# Patient Record
Sex: FEMALE | Race: WHITE
[De-identification: names, ages, dates, MRNs, and addresses within clinical notes are randomized per-mention and may not be internally consistent; named-entity substitution may affect disease eponyms.]

---

## 2017-10-16 ENCOUNTER — HOSPITAL ENCOUNTER (OUTPATIENT)
Dept: HOSPITAL 46 - OPS | Age: 72
Discharge: HOME | End: 2017-10-16
Attending: SURGERY
Payer: MEDICARE

## 2017-10-16 VITALS — BODY MASS INDEX: 39.32 KG/M2 | WEIGHT: 236 LBS | HEIGHT: 65 IN

## 2017-10-16 DIAGNOSIS — E11.9: ICD-10-CM

## 2017-10-16 DIAGNOSIS — D12.3: Primary | ICD-10-CM

## 2017-10-16 DIAGNOSIS — Z98.51: ICD-10-CM

## 2017-10-16 DIAGNOSIS — E03.9: ICD-10-CM

## 2017-10-16 DIAGNOSIS — I10: ICD-10-CM

## 2017-10-16 DIAGNOSIS — Z98.890: ICD-10-CM

## 2017-10-16 DIAGNOSIS — Z88.8: ICD-10-CM

## 2017-10-16 DIAGNOSIS — Z88.5: ICD-10-CM

## 2017-10-16 DIAGNOSIS — K62.1: ICD-10-CM

## 2017-10-16 DIAGNOSIS — Z90.49: ICD-10-CM

## 2017-10-16 DIAGNOSIS — K21.9: ICD-10-CM

## 2017-10-16 DIAGNOSIS — E66.9: ICD-10-CM

## 2017-10-16 DIAGNOSIS — Z88.1: ICD-10-CM

## 2017-10-16 DIAGNOSIS — G47.30: ICD-10-CM

## 2017-10-16 PROCEDURE — 0DBL8ZX EXCISION OF TRANSVERSE COLON, VIA NATURAL OR ARTIFICIAL OPENING ENDOSCOPIC, DIAGNOSTIC: ICD-10-PCS | Performed by: SURGERY

## 2017-10-16 PROCEDURE — 0DBP8ZX EXCISION OF RECTUM, VIA NATURAL OR ARTIFICIAL OPENING ENDOSCOPIC, DIAGNOSTIC: ICD-10-PCS | Performed by: SURGERY

## 2017-11-03 NOTE — OR
[facility]
                                    [address], [place]  [address]
_________________________________________________________________________________________
                                                                 Signed   
 
 
DATE OF PROCEDURE:  10/16/17
 
PREOPERATIVE DIAGNOSIS:  Episodic diarrhea.
 
POSTOPERATIVE DIAGNOSIS
Small polyp of splenic flexure (excised), no evidence of obvious colitis.
 
PROCEDURE
Total colonoscopy to cecum with biopsy of rectum and excision of polyp at splenic
flexure.
 
ANESTHESIA
Intravenous sedation with Propofol infusion, Zane De La Rosa CRNA.
 
INDICATION
This markedly debilitated 72-year-old obese white woman is a patient Dr. Peres and
known to me from the past. She underwent cholecystectomy in 1984. She has had
colonoscopy and 1 997, 2008, and 2013. She has medical problems of diabetes,
hypothyroidism, hypertension, and "irregular heartbeat" as well as asthma and sleep
apnea, in addition to her obesity. She has complained of diarrhea most commonly in the
evening if she eats late.  She has had no blood per rectum. She has had a polyp on
colonoscopy in the past. She is admitted to undergo colonoscopy to better characterize
the source of her diarrhea and understand the risks of bleeding, infection, and
perforation.
 
FINDINGS
The prep wa s good. Complete colonoscopy was undertaken to the cecum. Propofol
infusional anesthesia was used based on her elevated ASA level.
 
There was a very small polyp noted at splenic flexure, which was excised with cold
morcellation technique. Biopsy of the rectum was undertaken, the mucosa did not look
excessively abnormal.
 
DESCRIPTION OF PROCEDURE
The patient brought to the endoscopy suite and placed in lateral decubitus position.
Given intravenous sedation to the point of slurred speech and nystagmus with Propofol 
infusional technique with full cardiopulmonary monitoring by the anesthetist. A digital
rectal examination was normal.
 
An Olympus video colonoscope was passed in the rectum and manipulated throughout the
colon. Ultimately intubating the cecum, the  ileocecal valve and appendiceal orifice
were normal. Scope was withdrawn from that point and irrigation undertaken as necessary.
 
    Electronically Signed By: JAOSN GARCIAS MD  11/03/17 0748
_________________________________________________________________________________________
PATIENT NAME:     MATY CHAPPELL                        
MEDICAL RECORD #: [id number]                     OPERATIVE REPORT              
          ACCT #: [id number]  
DATE OF BIRTH:   [birth year]                                       
PHYSICIAN:   JASON GARCIAS MD                      REPORT #: [id number]
REPORT IS CONFIDENTIAL AND NOT TO BE RELEASED WITHOUT AUTHORIZATION
 
 
                                  [facility]
                                    [address], [place]  [address]
_________________________________________________________________________________________
                                                                 Signed   
 
 
There was no overt sign of colitis. A small polyp was noted at the splenic flexure,
which was excised with cold morcellation t lisa. Further withdrawal of scope showed
no other abnormality and the rectum itself had mild inflammatory change, but not much
and probably not a pathologic finding really. Biopsies were obtained to assess for
occult colitis. Retroflexed view was unde rtaken, was normal as well. The scope was
removed. The patient was taken to recovery room in good condition.
 
CONCLUDING DIAGNOSES
Small polyp at splenic flexure.
Uncertain inflammatory change of rectum.
 
PLAN
We will consider empiric treatment with Questran 4 g p.o. b.i.d. if biopsies are
negative for signs of actual colitis. Repeat colonoscopy will be dependent on the
histology of the polyp. If hyperplastic, standard recommendation 10 years and if
adenomatous, 3  to 5.
 
 
 
 
 
_____________________________
MD NIKHIL Lal/Francia
DD: 10/16/2017 13:51:35
DT: 10/16/2017 19:26:33
Job #: [id number]
 
cc:  Musa Peres DO
 
 
 
 
 
 
 
 
 
 
 
 
 
    Electronically Signed By: JASON GARCIAS MD  11/03/17 0748
_________________________________________________________________________________________
PATIENT NAME:     TAMATY M                        
MEDICAL RECORD #: [id number]                     OPERATIVE REPORT              
          ACCT #: [id number]  
DATE OF BIRTH:   [birth year]                                       
PHYSICIAN:   JASON GARCIAS MD                      REPORT #: [id number]
REPORT IS CONFIDENTIAL AND NOT TO BE RELEASED WITHOUT AUTHORIZATION

## 2018-01-14 ENCOUNTER — HOSPITAL ENCOUNTER (EMERGENCY)
Dept: HOSPITAL 46 - ED | Age: 73
Discharge: HOME | End: 2018-01-14
Payer: MEDICARE

## 2018-01-14 VITALS — HEIGHT: 65 IN | WEIGHT: 236 LBS | BODY MASS INDEX: 39.32 KG/M2

## 2018-01-14 DIAGNOSIS — Z91.012: ICD-10-CM

## 2018-01-14 DIAGNOSIS — Z79.899: ICD-10-CM

## 2018-01-14 DIAGNOSIS — K21.9: ICD-10-CM

## 2018-01-14 DIAGNOSIS — Z90.49: ICD-10-CM

## 2018-01-14 DIAGNOSIS — Z88.6: ICD-10-CM

## 2018-01-14 DIAGNOSIS — I10: ICD-10-CM

## 2018-01-14 DIAGNOSIS — Z88.5: ICD-10-CM

## 2018-01-14 DIAGNOSIS — Z88.0: ICD-10-CM

## 2018-01-14 DIAGNOSIS — Z98.51: ICD-10-CM

## 2018-01-14 DIAGNOSIS — Z79.82: ICD-10-CM

## 2018-01-14 DIAGNOSIS — R07.89: Primary | ICD-10-CM

## 2018-01-14 DIAGNOSIS — Z87.891: ICD-10-CM

## 2018-01-14 DIAGNOSIS — Z88.8: ICD-10-CM

## 2018-01-15 NOTE — EKG
[facility]
                                    [address] [place], [place]  [address]
_________________________________________________________________________________________
                                                                 Signed   
 
 
Normal sinus rhythm
Normal ECG
When compared with ECG of 24-FEB-2017 19:59,
No significant change was found
Confirmed by CHRISTIANO DANIEL MD ([id number]) on 1/15/2018 7:10:54 AM
 
 
 
 
 
 
 
 
 
 
 
 
 
 
 
 
 
 
 
 
 
 
 
 
 
 
 
 
 
 
 
 
 
 
 
 
 
 
 
 
    Electronically Signed By: CHRISTIANO DANIEL MD  01/15/18 0711
_________________________________________________________________________________________
PATIENT NAME:     MATY CHAPPELL                        
MEDICAL RECORD #: [id number]                     Electrocardiogram             
          ACCT #: [id number]  
DATE OF BIRTH:   [birth year]                                       
PHYSICIAN:   CHRISTIANO DANIEL MD                   REPORT #: [id number]
REPORT IS CONFIDENTIAL AND NOT TO BE RELEASED WITHOUT AUTHORIZATION

## 2021-10-18 NOTE — XMS
PreManage Notification: MATY CHAPPELL MRN:[id number]
 
Security Information
 
Security Events
No recent Security Events currently on file
 
 
 
CRITERIA MET
------------
- [facility] - 2 Visits in 30 Days
 
 
CARE PROVIDERS
There are no care providers on record at this time.
 
[facility] has no Care Guidelines for this patient.
 
[facility] VISIT COUNT (12 MO.)
-------------------------------------------------------------------------------------
2 [facility]
-------------------------------------------------------------------------------------
TOTAL 2
-------------------------------------------------------------------------------------
NOTE: Visits indicate total known visits.
 
ED/[facility] VISIT TRACKING (12 MO.)
-------------------------------------------------------------------------------------
10/17/2021 11:41
[facility] [place]
 
TYPE: Emergency
 
COMPLAINT:
- MOUSE BITE
-------------------------------------------------------------------------------------
09/20/2021 18:57
[facility] [place]
 
TYPE: Emergency
 
COMPLAINT:
- ABDOMINAL PAIN
 
DIAGNOSES:
- Right lower quadrant pain
- Unspecified asthma, uncomplicated
- Other long term (current) drug therapy
- Essential (primary) hypertension
- Allergy status to narcotic agent
- Allergy status to penicillin
- Gastro-esophageal reflux disease without esophagitis
- Type 2 diabetes mellitus without complications
- Unspecified osteoarthritis, unspecified site
- Allergy to eggs
- Personal history of nicotine dependence
- Allergy status to other drugs, medicaments and biological substances
-------------------------------------------------------------------------------------
 
 
 
INPATIENT VISIT TRACKING (12 MO.)
No inpatient visits to display in this time frame
 
[email or web address]